# Patient Record
Sex: MALE | Race: OTHER
[De-identification: names, ages, dates, MRNs, and addresses within clinical notes are randomized per-mention and may not be internally consistent; named-entity substitution may affect disease eponyms.]

---

## 2019-03-17 ENCOUNTER — TRANSCRIPTION ENCOUNTER (OUTPATIENT)
Age: 24
End: 2019-03-17

## 2019-03-21 ENCOUNTER — TRANSCRIPTION ENCOUNTER (OUTPATIENT)
Age: 24
End: 2019-03-21

## 2019-09-13 ENCOUNTER — APPOINTMENT (OUTPATIENT)
Dept: INTERNAL MEDICINE | Facility: CLINIC | Age: 24
End: 2019-09-13

## 2020-08-26 ENCOUNTER — APPOINTMENT (OUTPATIENT)
Dept: UROLOGY | Facility: CLINIC | Age: 25
End: 2020-08-26
Payer: COMMERCIAL

## 2020-08-26 VITALS — WEIGHT: 141 LBS | BODY MASS INDEX: 17.53 KG/M2 | HEIGHT: 75 IN

## 2020-08-26 VITALS — TEMPERATURE: 97.6 F

## 2020-08-26 DIAGNOSIS — N41.1 CHRONIC PROSTATITIS: ICD-10-CM

## 2020-08-26 DIAGNOSIS — R10.2 PELVIC AND PERINEAL PAIN: ICD-10-CM

## 2020-08-26 PROCEDURE — 99205 OFFICE O/P NEW HI 60 MIN: CPT

## 2020-08-27 LAB
APPEARANCE: CLEAR
BACTERIA: NEGATIVE
BILIRUBIN URINE: NEGATIVE
BLOOD URINE: NEGATIVE
COLOR: YELLOW
GLUCOSE QUALITATIVE U: NEGATIVE
HYALINE CASTS: 1 /LPF
KETONES URINE: NEGATIVE
LEUKOCYTE ESTERASE URINE: NEGATIVE
MICROSCOPIC-UA: NORMAL
NITRITE URINE: NEGATIVE
PH URINE: 6
PROTEIN URINE: NORMAL
RED BLOOD CELLS URINE: 4 /HPF
SPECIFIC GRAVITY URINE: 1.02
SQUAMOUS EPITHELIAL CELLS: 0 /HPF
UROBILINOGEN URINE: NORMAL
WHITE BLOOD CELLS URINE: 1 /HPF

## 2020-10-29 ENCOUNTER — APPOINTMENT (OUTPATIENT)
Dept: UROLOGY | Facility: CLINIC | Age: 25
End: 2020-10-29
Payer: COMMERCIAL

## 2020-10-29 VITALS — TEMPERATURE: 97.9 F

## 2020-10-29 DIAGNOSIS — M79.18 MYALGIA, OTHER SITE: ICD-10-CM

## 2020-10-29 PROCEDURE — 99072 ADDL SUPL MATRL&STAF TM PHE: CPT

## 2020-10-29 PROCEDURE — 99215 OFFICE O/P EST HI 40 MIN: CPT

## 2020-10-29 RX ORDER — DIAZEPAM 2 MG/1
2 TABLET ORAL 3 TIMES DAILY
Qty: 9 | Refills: 0 | Status: DISCONTINUED | COMMUNITY
Start: 2020-08-26 | End: 2020-10-29

## 2020-11-05 ENCOUNTER — APPOINTMENT (OUTPATIENT)
Dept: UROLOGY | Facility: CLINIC | Age: 25
End: 2020-11-05

## 2020-11-17 ENCOUNTER — OUTPATIENT (OUTPATIENT)
Dept: OUTPATIENT SERVICES | Facility: HOSPITAL | Age: 25
LOS: 1 days | End: 2020-11-17
Payer: COMMERCIAL

## 2020-11-17 ENCOUNTER — APPOINTMENT (OUTPATIENT)
Dept: UROLOGY | Facility: CLINIC | Age: 25
End: 2020-11-17
Payer: COMMERCIAL

## 2020-11-17 VITALS
BODY MASS INDEX: 17.05 KG/M2 | DIASTOLIC BLOOD PRESSURE: 80 MMHG | HEIGHT: 76 IN | RESPIRATION RATE: 16 BRPM | WEIGHT: 140 LBS | TEMPERATURE: 97.9 F | SYSTOLIC BLOOD PRESSURE: 132 MMHG | HEART RATE: 73 BPM

## 2020-11-17 DIAGNOSIS — R35.0 FREQUENCY OF MICTURITION: ICD-10-CM

## 2020-11-17 PROCEDURE — 93975 VASCULAR STUDY: CPT | Mod: 26

## 2020-11-17 PROCEDURE — 76870 US EXAM SCROTUM: CPT

## 2020-11-17 PROCEDURE — 99215 OFFICE O/P EST HI 40 MIN: CPT | Mod: 25

## 2020-11-17 PROCEDURE — 93975 VASCULAR STUDY: CPT

## 2020-11-17 PROCEDURE — 76870 US EXAM SCROTUM: CPT | Mod: 26

## 2020-11-17 NOTE — HISTORY OF PRESENT ILLNESS
[FreeTextEntry1] : Patient is a 25-year-old gentleman who presents with the chief complaint of left testicular pain for the past 6 years for evaluation. I reviewed the questionnaire he had completed with him in detail.  Patient stated the pain was on the right and now has been to the left.  He has been evaluated for swollen right testis 2 to 3 years prior.  All evaluation has been normal he states his pain on the left is 4 out of 10 in intensity and it is dull and occurs every day.  He has tried several medications such as amitriptyline which has not improved his pain.  He also states he has back pain.  He has not had a spermatic cord block.  The patient denies fevers, chills, nausea and/or vomiting and no unexplained weight loss. He has no known drug allergies.  His past medical history demonstrates no significant urologic issues.  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  He does smoke and drinks only socially.  He has no known drug allergies.  His review of systems is non-contributory. His family history is not significant.

## 2020-11-17 NOTE — ASSESSMENT
[FreeTextEntry1] : This pleasant gentleman presents with concerns regarding scrotal pain which had been on the right then bilateral and now is on the left.  This has been present for the past 6 years and he states he also has back pain.  He had no pain in the left hemiscrotum with examination. I have requested several baseline blood studies and additional imaging.  I will discuss the results of the blood test with him in 2 weeks.  I will also make more specific recommendations after the results of the requested tests return.  I am not sure at this time whether the spermatic cord block is indicated since he has no pain on exam on the left, the pain is dull and is not consistent although it occurs during the day and at the time of examination now he has no discomfort.  Scrotal ultrasound demonstrated large varicoceles particularly on the left.  I will discuss this further with him when we review the blood test in 2 weeks.\par \par Consultation: 40 minutes:  20 minutes reviewing his history and performing a physical examination.  20 minutes reviewing the ultrasound, writing for blood studies ,discussing treatment options and writing his note. There was also additional time in preparation for today's visit.\par

## 2020-11-17 NOTE — PHYSICAL EXAM
[General Appearance - Well Developed] : well developed [General Appearance - Well Nourished] : well nourished [Normal Appearance] : normal appearance [Well Groomed] : well groomed [General Appearance - In No Acute Distress] : no acute distress [Heart Rate And Rhythm] : Heart rate and rhythm were normal [Arterial Pulses Normal] : the pedal pulses were normal [Edema] : no peripheral edema [Respiration, Rhythm And Depth] : normal respiratory rhythm and effort [Exaggerated Use Of Accessory Muscles For Inspiration] : no accessory muscle use [Auscultation Breath Sounds / Voice Sounds] : lungs were clear to auscultation bilaterally [Chest Palpation] : palpation of the chest revealed no abnormalities [Lungs Percussion] : the lungs were normal to percussion [Bowel Sounds] : normal bowel sounds [Abdomen Soft] : soft [Abdomen Tenderness] : non-tender [Abdomen Mass (___ Cm)] : no abdominal mass palpated [Abdomen Hernia] : no hernia was discovered [Costovertebral Angle Tenderness] : no ~M costovertebral angle tenderness [Urethral Meatus] : meatus normal [Penis Abnormality] : normal circumcised penis [Urinary Bladder Findings] : the bladder was normal on palpation [Scrotum] : the scrotum was normal [Epididymis] : the epididymides were normal [Testes Tenderness] : no tenderness of the testes [Testes Mass (___cm)] : there were no testicular masses [Anus Abnormality] : the anus and perineum were normal [No Prostate Nodules] : no prostate nodules [Normal Station and Gait] : the gait and station were normal for the patient's age [Skin Color & Pigmentation] : normal skin color and pigmentation [Skin Turgor] : supple [] : no rash [Skin Lesions] : no skin lesions [No Focal Deficits] : no focal deficits [Sensation] : the sensory exam was normal to light touch and pinprick [Motor Exam] : the motor exam was normal [Oriented To Time, Place, And Person] : oriented to person, place, and time [Affect] : the affect was normal [Mood] : the mood was normal [Not Anxious] : not anxious [No Palpable Adenopathy] : no palpable adenopathy [Cervical Lymph Nodes Enlarged Posterior Bilaterally] : posterior cervical [Cervical Lymph Nodes Enlarged Anterior Bilaterally] : anterior cervical [Supraclavicular Lymph Nodes Enlarged Bilaterally] : supraclavicular [Axillary Lymph Nodes Enlarged Bilaterally] : axillary [Femoral Lymph Nodes Enlarged Bilaterally] : femoral [Inguinal Lymph Nodes Enlarged Bilaterally] : inguinal [FreeTextEntry1] : Thin male

## 2020-11-18 LAB
BASOPHILS # BLD AUTO: 0.04 K/UL
BASOPHILS NFR BLD AUTO: 0.8 %
EOSINOPHIL # BLD AUTO: 0.13 K/UL
EOSINOPHIL NFR BLD AUTO: 2.6 %
ESTRADIOL SERPL-MCNC: 37 PG/ML
FSH SERPL-MCNC: 2.5 IU/L
HCT VFR BLD CALC: 49 %
HGB BLD-MCNC: 15.9 G/DL
IMM GRANULOCYTES NFR BLD AUTO: 0.2 %
LH SERPL-ACNC: 5.6 IU/L
LYMPHOCYTES # BLD AUTO: 1.74 K/UL
LYMPHOCYTES NFR BLD AUTO: 34.7 %
MAN DIFF?: NORMAL
MCHC RBC-ENTMCNC: 28.8 PG
MCHC RBC-ENTMCNC: 32.4 GM/DL
MCV RBC AUTO: 88.6 FL
MONOCYTES # BLD AUTO: 0.35 K/UL
MONOCYTES NFR BLD AUTO: 7 %
NEUTROPHILS # BLD AUTO: 2.74 K/UL
NEUTROPHILS NFR BLD AUTO: 54.7 %
PLATELET # BLD AUTO: 236 K/UL
RBC # BLD: 5.53 M/UL
RBC # FLD: 12 %
TSH SERPL-ACNC: 1.81 UIU/ML
WBC # FLD AUTO: 5.01 K/UL

## 2020-11-24 LAB
TESTOST BND SERPL-MCNC: 15.5 PG/ML
TESTOST SERPL-MCNC: 1065.7 NG/DL

## 2020-11-27 DIAGNOSIS — N50.812 LEFT TESTICULAR PAIN: ICD-10-CM

## 2020-11-27 DIAGNOSIS — I86.1 SCROTAL VARICES: ICD-10-CM

## 2020-12-02 ENCOUNTER — APPOINTMENT (OUTPATIENT)
Dept: UROLOGY | Facility: CLINIC | Age: 25
End: 2020-12-02
Payer: COMMERCIAL

## 2020-12-02 DIAGNOSIS — I86.1 SCROTAL VARICES: ICD-10-CM

## 2020-12-02 DIAGNOSIS — N50.812 LEFT TESTICULAR PAIN: ICD-10-CM

## 2020-12-02 DIAGNOSIS — N45.1 EPIDIDYMITIS: ICD-10-CM

## 2020-12-02 PROCEDURE — 99214 OFFICE O/P EST MOD 30 MIN: CPT | Mod: 95

## 2020-12-02 NOTE — PHYSICAL EXAM
[General Appearance - Well Developed] : well developed [General Appearance - Well Nourished] : well nourished [Normal Appearance] : normal appearance [Well Groomed] : well groomed [General Appearance - In No Acute Distress] : no acute distress [Oriented To Time, Place, And Person] : oriented to person, place, and time [Affect] : the affect was normal [Mood] : the mood was normal [Not Anxious] : not anxious [FreeTextEntry1] : Thin male

## 2020-12-02 NOTE — HISTORY OF PRESENT ILLNESS
[Home] : at home, [unfilled] , at the time of the visit. [Medical Office: ([facility])___] : at the medical office located in  [Verbal consent obtained from patient] : the patient, [unfilled] [FreeTextEntry1] : The patient-doctor. relationship has been established in a face-to-face fashion on real-time video audio HIPAA compliant communication using telemedicine software.  The patient's identity has been confirmed.  The patient was previously emailed a copy of the telemedicine consent.  The patient has had a chance to review and has now given verbal consent and has requested care to be assessed and treated through telemedicine. The patient understands there may be limitations in this process and that they need not need further follow-up care in the office and/or hospital settings. We were unable to use the [employer] platform and an alternative platform was utilized.\par \par Verbal consent was given on Wednesday, December 2, 2020 at 12:50 PM by the patient.  It was requested by the physician.  A written consent was previously sent for the patient to sign and return.\par \par The patient returns for follow-up to review his recent blood studies and to discuss options for therapy.  Of note he recently had pulled out his back again and noticed an increase in his scrotal discomfort and he himself feels it might be referred to this area.\par \par PMH: Patient initially presented with with the chief complaint of left testicular pain for the past 6 years for evaluation.  Patient stated the pain was on the right and now has been to the left.  He has been evaluated for swollen right testis 2 to 3 years prior.  All evaluation has been normal he states his pain on the left is 4 out of 10 in intensity and it is dull and occurs every day.  He has tried several medications such as amitriptyline which has not improved his pain.  He also states he has back pain.  He has not had a spermatic cord block.  The patient denies fevers, chills, nausea and/or vomiting and no unexplained weight loss. He has no known drug allergies.  His past medical history demonstrates no significant urologic issues.  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  He does smoke and drinks only socially.  He has no known drug allergies.  His review of systems is non-contributory. His family history is not significant.

## 2020-12-02 NOTE — ASSESSMENT
[FreeTextEntry1] : The patient returns for follow-up to review his recent blood studies and to discuss options for therapy.  Of note he recently had pulled out his back again and noticed an increase in his scrotal discomfort and he himself feels it might be referred to this area.\par \par I reviewed his blood tests with him which were all normal including of a high testosterone and he is not on any supplementation.  His semen analysis is also excellent.  I have to agree with him that it might be referred from his back.  I have given him a series of stretching exercises and have advised that he might want to do something like yoga to to help him self.  I have also suggested on particularly painful days to try a nonsteroid anti-inflammatory drug in addition to the exercises.  He will be trying them out for the next month and I will speak with him by telehealth in 4 to 6 weeks.\par \par I I will hold off on the spermatic cord block at this time.  Scrotal ultrasound demonstrated large varicoceles particularly on the left.  I will discuss this further with him when we review the blood test in 2 weeks.\par \par Telehealth Consultation: 30 minutes  10 minutes reviewing his history and discussing prior results.  20 minutes discussing various treatment options including stretching exercises and other programs that might be helpful, and writing his note. There was also additional time in preparing for the visit and assisting the patient with technology issues he was having with the telehealth platform.

## 2021-01-14 ENCOUNTER — APPOINTMENT (OUTPATIENT)
Dept: UROLOGY | Facility: CLINIC | Age: 26
End: 2021-01-14

## 2021-05-28 ENCOUNTER — APPOINTMENT (OUTPATIENT)
Dept: FAMILY MEDICINE | Facility: CLINIC | Age: 26
End: 2021-05-28
Payer: COMMERCIAL

## 2021-05-28 VITALS
SYSTOLIC BLOOD PRESSURE: 120 MMHG | TEMPERATURE: 98.2 F | DIASTOLIC BLOOD PRESSURE: 70 MMHG | BODY MASS INDEX: 16.2 KG/M2 | OXYGEN SATURATION: 98 % | WEIGHT: 133 LBS | HEIGHT: 76 IN | HEART RATE: 88 BPM

## 2021-05-28 DIAGNOSIS — Z80.8 FAMILY HISTORY OF MALIGNANT NEOPLASM OF OTHER ORGANS OR SYSTEMS: ICD-10-CM

## 2021-05-28 DIAGNOSIS — R10.9 UNSPECIFIED ABDOMINAL PAIN: ICD-10-CM

## 2021-05-28 DIAGNOSIS — Z86.16 PERSONAL HISTORY OF COVID-19: ICD-10-CM

## 2021-05-28 DIAGNOSIS — K59.00 CONSTIPATION, UNSPECIFIED: ICD-10-CM

## 2021-05-28 DIAGNOSIS — R63.4 ABNORMAL WEIGHT LOSS: ICD-10-CM

## 2021-05-28 PROCEDURE — 99072 ADDL SUPL MATRL&STAF TM PHE: CPT

## 2021-05-28 PROCEDURE — 99385 PREV VISIT NEW AGE 18-39: CPT | Mod: 25

## 2021-05-28 PROCEDURE — 36415 COLL VENOUS BLD VENIPUNCTURE: CPT

## 2021-05-28 PROCEDURE — 99213 OFFICE O/P EST LOW 20 MIN: CPT | Mod: 25

## 2021-05-28 RX ORDER — AMITRIPTYLINE HYDROCHLORIDE 10 MG/1
10 TABLET, FILM COATED ORAL
Qty: 90 | Refills: 4 | Status: DISCONTINUED | COMMUNITY
Start: 2020-10-29 | End: 2021-05-28

## 2021-05-28 NOTE — PHYSICAL EXAM
[Normal Oropharynx] : the oropharynx was normal [Normal TMs] : both tympanic membranes were normal [No Lymphadenopathy] : no lymphadenopathy [Supple] : supple [No Edema] : there was no peripheral edema [No Extremity Clubbing/Cyanosis] : no extremity clubbing/cyanosis [Normal] : affect was normal and insight and judgment were intact [de-identified] : thin [de-identified] : blake kelley/l [de-identified] : upper thoracic and cervical paraspinal tension [de-identified] : CN II-XII intact

## 2021-05-28 NOTE — HISTORY OF PRESENT ILLNESS
[FreeTextEntry1] : Mr. ARAUJO presents for annual physical and few other issues. \par  [de-identified] : Mr. ARAUJO presents for annual physical and few other issues. \par Following with colorectal-on Steroid cream-fissure. \par Has gastro appointment June 15th. Immediately bloated after eating, gets nausea.\par Constipated a lot. Eats a lot of sushi. \par Has been suffering from some headaches. \par \par Dr. Shaikh-Colorectal Surgeon in [place]\par Fissures and Hemorrhoids\par \par Had COVID January 2021.  Still no taste no smell.  Has not been eating well.  Lost weight.\par Long history of GERD- had Endoscopy when younger.\par  \par Family Hx-colon cancer-Dad passed at age 42. \par Skin cancer-paternal gma, maternal grandpa \par Siblings healthy \par \par Seasonal allerges; self diagnosed lactose intolerance\par Also will take colace as needed. \par

## 2021-05-28 NOTE — REVIEW OF SYSTEMS
[Nausea] : nausea [Constipation] : constipation [Heartburn] : heartburn [Negative] : Genitourinary [Earache] : no earache [Sore Throat] : no sore throat [Diarrhea] : no diarrhea [Headache] : no headache [Dizziness] : no dizziness [FreeTextEntry4] : can't smell or taste [FreeTextEntry7] : bloating, constipation [de-identified] : headaches occassionally [de-identified] : 6-7 hours of sleep per night

## 2021-05-28 NOTE — HEALTH RISK ASSESSMENT
[No] : No [HIV test declined] : HIV test declined [# of Members in Household ___] :  household currently consist of [unfilled] member(s) [Employed] : employed [Sexually Active] : sexually active [Fully functional (bathing, dressing, toileting, transferring, walking, feeding)] : Fully functional (bathing, dressing, toileting, transferring, walking, feeding) [Fully functional (using the telephone, shopping, preparing meals, housekeeping, doing laundry, using] : Fully functional and needs no help or supervision to perform IADLs (using the telephone, shopping, preparing meals, housekeeping, doing laundry, using transportation, managing medications and managing finances) [] : No [de-identified] : Varied diet  [de-identified] : mom, grandmother [FreeTextEntry2] : [employer]; [employer]  [de-identified] : Declines std testing, female partners [de-identified] : eyeglasses-eye exam 3 months ago [de-identified] : Dentist 4-5 months

## 2021-05-28 NOTE — PLAN
[FreeTextEntry1] : Will follow up labwork drawn in office today.\par \par Abdominal Discomfort-keep food journal, identify trigger foods.  Check stool studies since it has been ongoing. \par Has upcoming GI appointment and is following with colorectal.\par \par Will adjust meds based on labs. \par Patient expressed understanding of plan.\par \par

## 2021-06-01 LAB
25(OH)D3 SERPL-MCNC: 40.6 NG/ML
ALBUMIN SERPL ELPH-MCNC: 5 G/DL
ALP BLD-CCNC: 52 U/L
ALT SERPL-CCNC: 13 U/L
ANION GAP SERPL CALC-SCNC: 11 MMOL/L
APPEARANCE: CLEAR
AST SERPL-CCNC: 19 U/L
BASOPHILS # BLD AUTO: 0.04 K/UL
BASOPHILS NFR BLD AUTO: 0.6 %
BILIRUB SERPL-MCNC: 0.5 MG/DL
BILIRUBIN URINE: NEGATIVE
BLOOD URINE: NEGATIVE
BUN SERPL-MCNC: 11 MG/DL
CALCIUM SERPL-MCNC: 9.7 MG/DL
CHLORIDE SERPL-SCNC: 103 MMOL/L
CHOLEST SERPL-MCNC: 217 MG/DL
CO2 SERPL-SCNC: 26 MMOL/L
COLOR: COLORLESS
CREAT SERPL-MCNC: 1.15 MG/DL
EOSINOPHIL # BLD AUTO: 0.11 K/UL
EOSINOPHIL NFR BLD AUTO: 1.6 %
ESTIMATED AVERAGE GLUCOSE: 103 MG/DL
FOLATE SERPL-MCNC: 15 NG/ML
GLUCOSE QUALITATIVE U: NEGATIVE
GLUCOSE SERPL-MCNC: 86 MG/DL
HBA1C MFR BLD HPLC: 5.2 %
HCT VFR BLD CALC: 45.8 %
HDLC SERPL-MCNC: 68 MG/DL
HGB BLD-MCNC: 15.4 G/DL
IMM GRANULOCYTES NFR BLD AUTO: 0.1 %
KETONES URINE: NEGATIVE
LDLC SERPL CALC-MCNC: 134 MG/DL
LEUKOCYTE ESTERASE URINE: NEGATIVE
LYMPHOCYTES # BLD AUTO: 2.04 K/UL
LYMPHOCYTES NFR BLD AUTO: 29.1 %
MAN DIFF?: NORMAL
MCHC RBC-ENTMCNC: 29.1 PG
MCHC RBC-ENTMCNC: 33.6 GM/DL
MCV RBC AUTO: 86.6 FL
MONOCYTES # BLD AUTO: 0.43 K/UL
MONOCYTES NFR BLD AUTO: 6.1 %
NEUTROPHILS # BLD AUTO: 4.39 K/UL
NEUTROPHILS NFR BLD AUTO: 62.5 %
NITRITE URINE: NEGATIVE
NONHDLC SERPL-MCNC: 150 MG/DL
PH URINE: 6.5
PLATELET # BLD AUTO: 220 K/UL
POTASSIUM SERPL-SCNC: 4.2 MMOL/L
PROT SERPL-MCNC: 7.3 G/DL
PROTEIN URINE: NEGATIVE
RBC # BLD: 5.29 M/UL
RBC # FLD: 12.1 %
SODIUM SERPL-SCNC: 139 MMOL/L
SPECIFIC GRAVITY URINE: 1.01
TRIGL SERPL-MCNC: 78 MG/DL
TSH SERPL-ACNC: 1.14 UIU/ML
UROBILINOGEN URINE: NORMAL
VIT B12 SERPL-MCNC: 582 PG/ML
WBC # FLD AUTO: 7.02 K/UL

## 2021-06-10 ENCOUNTER — APPOINTMENT (OUTPATIENT)
Dept: FAMILY MEDICINE | Facility: CLINIC | Age: 26
End: 2021-06-10

## 2021-09-02 ENCOUNTER — TRANSCRIPTION ENCOUNTER (OUTPATIENT)
Age: 26
End: 2021-09-02

## 2021-11-03 ENCOUNTER — TRANSCRIPTION ENCOUNTER (OUTPATIENT)
Age: 26
End: 2021-11-03

## 2022-02-18 ENCOUNTER — APPOINTMENT (OUTPATIENT)
Dept: FAMILY MEDICINE | Facility: CLINIC | Age: 27
End: 2022-02-18

## 2022-03-02 ENCOUNTER — NON-APPOINTMENT (OUTPATIENT)
Age: 27
End: 2022-03-02

## 2022-03-02 ENCOUNTER — APPOINTMENT (OUTPATIENT)
Dept: FAMILY MEDICINE | Facility: CLINIC | Age: 27
End: 2022-03-02
Payer: COMMERCIAL

## 2022-03-02 DIAGNOSIS — K52.9 NONINFECTIVE GASTROENTERITIS AND COLITIS, UNSPECIFIED: ICD-10-CM

## 2022-03-02 PROCEDURE — 99213 OFFICE O/P EST LOW 20 MIN: CPT | Mod: 95

## 2022-03-02 NOTE — ASSESSMENT
[FreeTextEntry1] : gastroenteritis\par Patient was advised to drink plenty of fluid, preferably ones with electrolytes such as vitamin water or Gatorade. Patient was told to stick to a simple diet of bread, rice, tea and to avoid dairy, fatty foods and friend foods. Progress diet slowly and as tolerated. No antibiotics are necessary at this time.  If there is any worsening of symptoms, or no improvement, come back to this office or go directly to the ER.\par \par vomiting and fever for one day, nausea, now has not thrown up since this morning,\par keeping down gatorade and saltines\par \par monitor fever\par if no improvement, office visit for bw or if any worsening, ER

## 2022-03-02 NOTE — HISTORY OF PRESENT ILLNESS
[Home] : at home, [unfilled] , at the time of the visit. [Medical Office: ([facility])___] : at the medical office located in  [Verbal consent obtained from patient] : the patient, [unfilled] [FreeTextEntry8] : 26 year old male here with complaints of vomiting and fever for one day, nausea \par Patients active medications, allergies and issues were all reviewed with the patient at time of visit.\par

## 2022-03-31 PROBLEM — Z00.00 ENCOUNTER FOR PREVENTIVE HEALTH EXAMINATION: Noted: 2022-03-31

## 2022-04-24 ENCOUNTER — APPOINTMENT (OUTPATIENT)
Dept: FAMILY MEDICINE | Facility: CLINIC | Age: 27
End: 2022-04-24
Payer: COMMERCIAL

## 2022-04-24 VITALS
HEART RATE: 91 BPM | HEIGHT: 76 IN | OXYGEN SATURATION: 98 % | BODY MASS INDEX: 15.95 KG/M2 | DIASTOLIC BLOOD PRESSURE: 76 MMHG | TEMPERATURE: 98.6 F | WEIGHT: 131 LBS | SYSTOLIC BLOOD PRESSURE: 124 MMHG

## 2022-04-24 DIAGNOSIS — M54.81 OCCIPITAL NEURALGIA: ICD-10-CM

## 2022-04-24 PROCEDURE — 99214 OFFICE O/P EST MOD 30 MIN: CPT

## 2022-04-24 NOTE — ASSESSMENT
[FreeTextEntry1] : discussed R/B/A to MM\par pt will f/u with multiple providers\par certified for one year\par \par discussed L-P, ID consult

## 2022-04-24 NOTE — HISTORY OF PRESENT ILLNESS
[FreeTextEntry8] : 27 yr old male- reports was going to chiropractor- y roverto- 2/17/2022- S&S 8 hrs later fever 102.5, nausea and burning sensation at back of head- then next day was asymptomatic- got x rays from chiropractor- and then went to ortho Dr coleman at [facility] and chris - went for MRI-which reports showed just a neck spasm- recommended PT - \par reports on first day of PT- went to ED for fever, nausea had a TEB with Dr Alvarado- ddnt resolve- and went to ED [facility]- CTA - neg - recom for neuro follow up\par then followed up with Neuro - was given lidocaine injections  and muscle relaxer/cyclobenzaprine. and now on  baclofen which reports is working well-  and continued with PT - \par \par last wed- fever 101, burning sensation- back of head, nausea  lasts 24 hrs.

## 2022-04-26 DIAGNOSIS — R50.9 FEVER, UNSPECIFIED: ICD-10-CM

## 2022-05-09 ENCOUNTER — APPOINTMENT (OUTPATIENT)
Dept: INFECTIOUS DISEASE | Facility: CLINIC | Age: 27
End: 2022-05-09

## 2022-06-24 ENCOUNTER — APPOINTMENT (OUTPATIENT)
Dept: PAIN MANAGEMENT | Facility: CLINIC | Age: 27
End: 2022-06-24

## 2022-06-24 VITALS — HEIGHT: 76 IN | BODY MASS INDEX: 15.83 KG/M2 | WEIGHT: 130 LBS

## 2022-06-24 PROCEDURE — 99204 OFFICE O/P NEW MOD 45 MIN: CPT

## 2022-06-24 NOTE — HISTORY OF PRESENT ILLNESS
[Neck] : neck [Burning] : burning [Radiating] : radiating [Squeezing] : squeezing [Meds] : meds [Physical therapy] : physical therapy [FreeTextEntry1] : Initial HPI 06/24/2022: \par Pain started 4 months ago after chiropractic adjustment and is on the center neck and radiates up the neck to the head described as a burning spasm pain. Has been doing PT. Also says he has TMJ.  Gets random episodes of pain which includes burning spasm pain with nausea, fever and blurry vision and body weakness. Had a cervical MRI in the hospital and MRI of the pain including CTAs which was all normal. Has been seeing a neurologist and was getting botox. Neurologist recommended facet blocks. Also tried acupuncture which caused more burning. \par \par Physical Therapy: Yes\par Pain Medications: baclofen, gabapentin 100mg QHS \par Past Injections: none\par Spine surgery: none \par Blood thinners: none\par \par \par  [] : no [FreeTextEntry6] : swelling  [FreeTextEntry7] : back of the head [de-identified] : lifting, moving jaw the wrong way  [de-identified] : C MRI

## 2022-06-24 NOTE — PHYSICAL EXAM
[de-identified] : Constitutional; Appears well, no apparent distress\par Ability to communicate: Normal \par Respiratory: non-labored breathing\par Skin: No rash noted\par Head: Normocephalic, atraumatic\par Neck: no visible thyroid enlargement\par Eyes: Extraocular movements intact\par Neurologic: Alert and oriented x3\par Psychiatric: normal mood, affect and behavior \par \par  [] : paracervical tenderness

## 2022-06-24 NOTE — DISCUSSION/SUMMARY
[de-identified] : After discussing various treatment options with the patient including but not limited to oral medications, physical therapy, exercise modalities as well as interventional spinal injections, we have decided with the following plan:\par \par - Continue Home exercises, stretching, activity modification, physical therapy, and conservative care.\par - MRI report and/or images was reviewed and discussed with the patient.\par - Recommend First Diagnostic LEFT C3,4,5,6 Medial Branch Blocks under fluoroscopic guidance with image. \par - Recommend First Diagnostic RIGHT C3,4,5,6 Medial Branch Blocks under fluoroscopic guidance with image. \par - Patient presents with axial lumbar pain that has not responded to 3 months of conservative therapy including physical therapy or NSAID therapy.  The pain is interfering with activities of daily living and functionality. There is no radicular pain. The pain is exacerbated by facet loading. The patient has not had a vertebral fusion at the levels of the proposed treatment.  There is no unexplained neurologic deficit.  There is no history of systemic infection, unstable medical condition, bleeding tendency, or local infection.  The injection is being performed to diagnose the facet joint as the source of the individual's pain, in preparation for a radiofrequency ablation. \par - The risks, benefits, contents and alternatives to injection were explained in full to the patient.  Risks outlined include but are not limited to infection, sepsis, bleeding, post-dural puncture headache, nerve damage, temporary increase in pain, syncopal episode, failure to resolve symptoms, allergic reaction, symptom recurrence, and elevation of blood sugar in diabetics. Cortisone may cause immunosuppression.  Patient understands the risks.  All questions were answered.  After discussion of options, patient requested an injection.  Information regarding the injection was given to the patient.  Which medications to stop prior to the injection was explained to the patient as well.\par - Follow up in 1-2 weeks post injection for re-evaluation.\par - Will call to schedule.\par

## 2022-08-08 ENCOUNTER — FORM ENCOUNTER (OUTPATIENT)
Age: 27
End: 2022-08-08

## 2022-08-10 ENCOUNTER — FORM ENCOUNTER (OUTPATIENT)
Age: 27
End: 2022-08-10

## 2022-10-05 ENCOUNTER — APPOINTMENT (OUTPATIENT)
Dept: PAIN MANAGEMENT | Facility: CLINIC | Age: 27
End: 2022-10-05

## 2022-10-05 VITALS — HEIGHT: 76 IN | WEIGHT: 130 LBS | BODY MASS INDEX: 15.83 KG/M2

## 2022-10-05 PROCEDURE — 99214 OFFICE O/P EST MOD 30 MIN: CPT

## 2022-10-05 NOTE — PHYSICAL EXAM
[Normal Coordination] : normal coordination [Normal DTR UE/LE] : normal DTR UE/LE  [Normal Sensation] : normal sensation [Normal Mood and Affect] : normal mood and affect [Able to Communicate] : able to communicate [Normal Skin] : normal skin [No Rash] : no rash [No Ulcers] : no ulcers [No Lesions] : no lesions [No obvious lymphadenopathy in areas examined] : no obvious lymphadenopathy in areas examined [Well Developed] : well developed [Well Nourished] : well nourished [No Respiratory Distress] : no respiratory distress

## 2022-10-05 NOTE — DATA REVIEWED
[MRI] : MRI [Cervical Spine] : cervical spine [Report was reviewed and noted in the chart] : The report was reviewed and noted in the chart [FreeTextEntry1] : 3/2022 ANGELINA

## 2022-10-05 NOTE — HISTORY OF PRESENT ILLNESS
[Neck] : neck [4] : 4 [Burning] : burning [Dull/Aching] : dull/aching [Shooting] : shooting [Intermittent] : intermittent [Work] : work [Sleep] : sleep [Meds] : meds [Full time] : Work status: full time [FreeTextEntry1] : Pt.states his having a lot of neck pain \par \par Pt reports a history of back and neck pain treated with ongoing chiro treatments that he states during a treatment when a Y band was used for traction he felt the acute onset which was in February. He felt the sudden onset of jaw pain, nausea and posterior neck burning pain. He is currently on medication and it manages his symptoms well. Normally he is able to lift even a 50 lb box but then something as benign as showering he can have an acute episode most of which last 20-30 minutes. He was admitted to the hospital stating that he had a fever following this episode within that week. He had a fever he states by temporal scanner but not febrile PO. He also then started PT, acupuncture and botox all of which has not significantly changed the course of his symptoms. MRI was done, report not available for review.  [] : no [de-identified] : random

## 2022-10-05 NOTE — DISCUSSION/SUMMARY
[de-identified] : Reviewed MRI of March 2022 and Sept 2022 which reveals no identifiable pathology, not even a facet effusion suggesting prior trauma.This appears most likely a stretch of a nerve bundle or discrete nerve or an aggravated already present instability that patient may have had that was asymptomatic.  I advised that this may be time and conservative treatment prior to resolution. Continue gabapentin. There may be a role for an EMG to help better focus on a potential etiology of the pain if for no other reason just to give patient a concrete etiology and what he can expect for a time course. \par \par Followup prn. Continue to followup with neuro.

## 2022-10-06 ENCOUNTER — APPOINTMENT (OUTPATIENT)
Dept: NEUROLOGY | Facility: CLINIC | Age: 27
End: 2022-10-06

## 2022-10-06 DIAGNOSIS — M79.2 NEURALGIA AND NEURITIS, UNSPECIFIED: ICD-10-CM

## 2022-10-06 PROCEDURE — 95911 NRV CNDJ TEST 9-10 STUDIES: CPT

## 2022-10-06 PROCEDURE — 95886 MUSC TEST DONE W/N TEST COMP: CPT

## 2022-10-07 ENCOUNTER — APPOINTMENT (OUTPATIENT)
Dept: PAIN MANAGEMENT | Facility: CLINIC | Age: 27
End: 2022-10-07

## 2022-10-09 ENCOUNTER — NON-APPOINTMENT (OUTPATIENT)
Age: 27
End: 2022-10-09

## 2022-10-12 ENCOUNTER — APPOINTMENT (OUTPATIENT)
Dept: PAIN MANAGEMENT | Facility: CLINIC | Age: 27
End: 2022-10-12

## 2022-10-20 ENCOUNTER — APPOINTMENT (OUTPATIENT)
Dept: PAIN MANAGEMENT | Facility: CLINIC | Age: 27
End: 2022-10-20

## 2022-10-20 VITALS — BODY MASS INDEX: 17.16 KG/M2 | HEIGHT: 75 IN | WEIGHT: 138 LBS

## 2022-10-20 DIAGNOSIS — M47.812 SPONDYLOSIS W/OUT MYELOPATHY OR RADICULOPATHY, CERVICAL REGION: ICD-10-CM

## 2022-10-20 DIAGNOSIS — M54.2 CERVICALGIA: ICD-10-CM

## 2022-10-20 DIAGNOSIS — M79.10 MYALGIA, UNSPECIFIED SITE: ICD-10-CM

## 2022-10-20 PROCEDURE — 99213 OFFICE O/P EST LOW 20 MIN: CPT

## 2022-10-20 NOTE — HISTORY OF PRESENT ILLNESS
[Neck] : neck [4] : 4 [Burning] : burning [Dull/Aching] : dull/aching [Shooting] : shooting [Intermittent] : intermittent [Work] : work [Sleep] : sleep [Meds] : meds [Full time] : Work status: full time [FreeTextEntry1] : Pt.states his having a lot of neck pain \par \par Pt reports a history of back and neck pain treated with ongoing chiro treatments that he states during a treatment when a Y band was used for traction he felt the acute onset which was in February. He felt the sudden onset of jaw pain, nausea and posterior neck burning pain. He is currently on medication and it manages his symptoms well. Normally he is able to lift even a 50 lb box but then something as benign as showering he can have an acute episode most of which last 20-30 minutes. He was admitted to the hospital stating that he had a fever following this episode within that week. He had a fever he states by temporal scanner but not febrile PO. He also then started PT, acupuncture and botox all of which has not significantly changed the course of his symptoms. MRI was done, report not available for review.  [] : no [de-identified] : random

## 2022-10-20 NOTE — DISCUSSION/SUMMARY
[de-identified] : Pt understands normal study of EMG and MRI and he understands the chronology of a nerve injury and or stretch injury that may have occurred by either the chiro or acupuncturist or possibly both as he reports the burning pain got worse following an acupunture treatment. He is also pursuing treatment for his known TMJ and other orthodontic issues that are present as well. He will continue PT as well.

## 2022-11-15 ENCOUNTER — NON-APPOINTMENT (OUTPATIENT)
Age: 27
End: 2022-11-15

## 2022-12-05 ENCOUNTER — APPOINTMENT (OUTPATIENT)
Dept: ORTHOPEDIC SURGERY | Facility: CLINIC | Age: 27
End: 2022-12-05

## 2022-12-05 ENCOUNTER — NON-APPOINTMENT (OUTPATIENT)
Age: 27
End: 2022-12-05

## 2022-12-05 VITALS — WEIGHT: 135 LBS | HEIGHT: 76 IN | BODY MASS INDEX: 16.44 KG/M2

## 2023-03-24 ENCOUNTER — APPOINTMENT (OUTPATIENT)
Dept: FAMILY MEDICINE | Facility: CLINIC | Age: 28
End: 2023-03-24
Payer: COMMERCIAL

## 2023-03-24 VITALS
OXYGEN SATURATION: 98 % | DIASTOLIC BLOOD PRESSURE: 74 MMHG | WEIGHT: 138 LBS | TEMPERATURE: 98.6 F | BODY MASS INDEX: 16.81 KG/M2 | SYSTOLIC BLOOD PRESSURE: 112 MMHG | HEIGHT: 76 IN | HEART RATE: 84 BPM

## 2023-03-24 DIAGNOSIS — M25.50 PAIN IN UNSPECIFIED JOINT: ICD-10-CM

## 2023-03-24 PROCEDURE — 36415 COLL VENOUS BLD VENIPUNCTURE: CPT

## 2023-03-24 PROCEDURE — 99214 OFFICE O/P EST MOD 30 MIN: CPT | Mod: 25

## 2023-03-24 NOTE — HISTORY OF PRESENT ILLNESS
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   \par Felt cool sensation in arm. \par Right index finger.  2-3 days was swollen-better than what it was.  \par Iced it.  Not painful but pressure.  Was unable to bend it.  Is better than it was a few days ago.  No Known injury.\par \par Ongoing neck pain-Neck injury-going with PT.  Saw HSS-3 months ago for neck.  History of knee pain.  Chrondomallecia patella.  \par Had MM card-did not help.  \par \par Sees Neurologist-Dr. Alamo-Baclofen tid a day, for neck.  \par \par Had peanut bar with honey-2 days ago.  Tongue tingled-took Benadryl which helped.  \par

## 2023-03-24 NOTE — REVIEW OF SYSTEMS
[Constipation] : constipation [Fever] : no fever [Chills] : no chills [Nasal Discharge] : no nasal discharge [Sore Throat] : no sore throat [Chest Pain] : no chest pain [Shortness Of Breath] : no shortness of breath [Wheezing] : no wheezing [Nausea] : no nausea [Diarrhea] : no diarrhea [Vomiting] : no vomiting [Dysuria] : no dysuria [Headache] : no headache [Dizziness] : no dizziness [FreeTextEntry7] : drinking plenty of fluids-on metamucil, last BM yesterday

## 2023-03-24 NOTE — PLAN
[FreeTextEntry1] : Will follow up labwork drawn in office today.\par Joint Pain/Swelling-check inflammatory markers. Rheum eval. \par Check Uric Acid level, CBC. \par \par Ortho Hand Referral placed for reduced ROM in index finger.\par Can continue to ice.  Ibuprofen or motrin as needed. \par \par Patient will be making follow-up appointment with his GI. \par

## 2023-03-24 NOTE — PHYSICAL EXAM
[Normal] : affect was normal and insight and judgment were intact [No Acute Distress] : no acute distress [Well Developed] : well developed [Normal Oropharynx] : the oropharynx was normal [No Lymphadenopathy] : no lymphadenopathy [No Extremity Clubbing/Cyanosis] : no extremity clubbing/cyanosis [Grossly Normal Strength/Tone] : grossly normal strength/tone [de-identified] :  strength intact b/l; mild edema right index finger, slight erythema, flexion intact  [de-identified] : negative tinel's sign b/l

## 2023-03-27 DIAGNOSIS — M79.644 PAIN IN RIGHT FINGER(S): ICD-10-CM

## 2023-03-27 LAB
ALBUMIN SERPL ELPH-MCNC: 5.1 G/DL
ALP BLD-CCNC: 60 U/L
ALT SERPL-CCNC: 26 U/L
ANION GAP SERPL CALC-SCNC: 12 MMOL/L
AST SERPL-CCNC: 30 U/L
BASOPHILS # BLD AUTO: 0.05 K/UL
BASOPHILS NFR BLD AUTO: 0.8 %
BILIRUB SERPL-MCNC: 0.4 MG/DL
BUN SERPL-MCNC: 10 MG/DL
CALCIUM SERPL-MCNC: 10.3 MG/DL
CHLORIDE SERPL-SCNC: 101 MMOL/L
CO2 SERPL-SCNC: 29 MMOL/L
CREAT SERPL-MCNC: 1.13 MG/DL
CRP SERPL-MCNC: <3 MG/L
EGFR: 92 ML/MIN/1.73M2
EOSINOPHIL # BLD AUTO: 0.15 K/UL
EOSINOPHIL NFR BLD AUTO: 2.3 %
ERYTHROCYTE [SEDIMENTATION RATE] IN BLOOD BY WESTERGREN METHOD: 2 MM/HR
GLUCOSE SERPL-MCNC: 51 MG/DL
HCT VFR BLD CALC: 48.2 %
HGB BLD-MCNC: 15.8 G/DL
IMM GRANULOCYTES NFR BLD AUTO: 0.3 %
LYMPHOCYTES # BLD AUTO: 1.9 K/UL
LYMPHOCYTES NFR BLD AUTO: 29.5 %
MAN DIFF?: NORMAL
MCHC RBC-ENTMCNC: 29.6 PG
MCHC RBC-ENTMCNC: 32.8 GM/DL
MCV RBC AUTO: 90.4 FL
MONOCYTES # BLD AUTO: 0.49 K/UL
MONOCYTES NFR BLD AUTO: 7.6 %
NEUTROPHILS # BLD AUTO: 3.84 K/UL
NEUTROPHILS NFR BLD AUTO: 59.5 %
PLATELET # BLD AUTO: 236 K/UL
POTASSIUM SERPL-SCNC: 4.7 MMOL/L
PROT SERPL-MCNC: 7.3 G/DL
RBC # BLD: 5.33 M/UL
RBC # FLD: 12.3 %
RHEUMATOID FACT SER QL: <10 IU/ML
SODIUM SERPL-SCNC: 142 MMOL/L
TSH SERPL-ACNC: 1.54 UIU/ML
URATE SERPL-MCNC: 4.2 MG/DL
WBC # FLD AUTO: 6.45 K/UL

## 2023-03-29 ENCOUNTER — APPOINTMENT (OUTPATIENT)
Dept: ORTHOPEDIC SURGERY | Facility: CLINIC | Age: 28
End: 2023-03-29

## 2023-07-14 ENCOUNTER — NON-APPOINTMENT (OUTPATIENT)
Age: 28
End: 2023-07-14

## 2023-11-28 ENCOUNTER — NON-APPOINTMENT (OUTPATIENT)
Age: 28
End: 2023-11-28

## 2024-02-19 ENCOUNTER — NON-APPOINTMENT (OUTPATIENT)
Age: 29
End: 2024-02-19

## 2024-03-22 ENCOUNTER — APPOINTMENT (OUTPATIENT)
Dept: FAMILY MEDICINE | Facility: CLINIC | Age: 29
End: 2024-03-22
Payer: MEDICAID

## 2024-03-22 VITALS
DIASTOLIC BLOOD PRESSURE: 77 MMHG | OXYGEN SATURATION: 98 % | WEIGHT: 141 LBS | BODY MASS INDEX: 17.17 KG/M2 | RESPIRATION RATE: 15 BRPM | TEMPERATURE: 97.6 F | HEIGHT: 76 IN | SYSTOLIC BLOOD PRESSURE: 112 MMHG | HEART RATE: 94 BPM

## 2024-03-22 DIAGNOSIS — G89.29 CERVICALGIA: ICD-10-CM

## 2024-03-22 DIAGNOSIS — Z00.00 ENCOUNTER FOR GENERAL ADULT MEDICAL EXAMINATION W/OUT ABNORMAL FINDINGS: ICD-10-CM

## 2024-03-22 DIAGNOSIS — Z13.1 ENCOUNTER FOR SCREENING FOR DIABETES MELLITUS: ICD-10-CM

## 2024-03-22 DIAGNOSIS — E55.9 VITAMIN D DEFICIENCY, UNSPECIFIED: ICD-10-CM

## 2024-03-22 DIAGNOSIS — M54.2 CERVICALGIA: ICD-10-CM

## 2024-03-22 PROCEDURE — 99395 PREV VISIT EST AGE 18-39: CPT

## 2024-03-22 RX ORDER — AMITRIPTYLINE HYDROCHLORIDE 25 MG/1
25 TABLET, FILM COATED ORAL
Refills: 0 | Status: DISCONTINUED | COMMUNITY
End: 2024-03-22

## 2024-03-22 RX ORDER — BACLOFEN 10 MG/1
10 TABLET ORAL
Refills: 0 | Status: DISCONTINUED | COMMUNITY
End: 2024-03-22

## 2024-03-22 NOTE — PHYSICAL EXAM
[No Acute Distress] : no acute distress [Well Nourished] : well nourished [Well Developed] : well developed [Well-Appearing] : well-appearing [Normal Sclera/Conjunctiva] : normal sclera/conjunctiva [PERRL] : pupils equal round and reactive to light [Normal Outer Ear/Nose] : the outer ears and nose were normal in appearance [Normal Oropharynx] : the oropharynx was normal [No JVD] : no jugular venous distention [No Lymphadenopathy] : no lymphadenopathy [Supple] : supple [Thyroid Normal, No Nodules] : the thyroid was normal and there were no nodules present [No Respiratory Distress] : no respiratory distress  [No Accessory Muscle Use] : no accessory muscle use [Normal Rate] : normal rate  [Clear to Auscultation] : lungs were clear to auscultation bilaterally [Regular Rhythm] : with a regular rhythm [Normal S1, S2] : normal S1 and S2 [No Murmur] : no murmur heard [No Carotid Bruits] : no carotid bruits [No Varicosities] : no varicosities [Pedal Pulses Present] : the pedal pulses are present [No Edema] : there was no peripheral edema [No Extremity Clubbing/Cyanosis] : no extremity clubbing/cyanosis [Soft] : abdomen soft [Non Tender] : non-tender [Non-distended] : non-distended [No Masses] : no abdominal mass palpated [Normal Bowel Sounds] : normal bowel sounds [No HSM] : no HSM [Normal Posterior Cervical Nodes] : no posterior cervical lymphadenopathy [Normal Anterior Cervical Nodes] : no anterior cervical lymphadenopathy [No CVA Tenderness] : no CVA  tenderness [No Spinal Tenderness] : no spinal tenderness [Grossly Normal Strength/Tone] : grossly normal strength/tone [No Rash] : no rash [No Joint Swelling] : no joint swelling [No Focal Deficits] : no focal deficits [Coordination Grossly Intact] : coordination grossly intact [Normal Gait] : normal gait [Normal Affect] : the affect was normal [Alert and Oriented x3] : oriented to person, place, and time [Normal Insight/Judgement] : insight and judgment were intact

## 2024-03-24 PROBLEM — M54.2 CHRONIC NECK PAIN: Status: ACTIVE | Noted: 2022-04-24

## 2024-03-24 NOTE — HEALTH RISK ASSESSMENT
[Good] : ~his/her~  mood as  good [No] : In the past 12 months have you used drugs other than those required for medical reasons? No [Little interest or pleasure doing things] : 1) Little interest or pleasure doing things [Feeling down, depressed, or hopeless] : 2) Feeling down, depressed, or hopeless [0] : 2) Feeling down, depressed, or hopeless: Not at all (0) [PHQ-2 Negative - No further assessment needed] : PHQ-2 Negative - No further assessment needed [HIV test declined] : HIV test declined [Hepatitis C test declined] : Hepatitis C test declined [With Family] : lives with family [Employed] : employed [College] : College [Significant Other] : lives with significant other [Feels Safe at Home] : Feels safe at home [Sexually Active] : sexually active [Fully functional (bathing, dressing, toileting, transferring, walking, feeding)] : Fully functional (bathing, dressing, toileting, transferring, walking, feeding) [Fully functional (using the telephone, shopping, preparing meals, housekeeping, doing laundry, using] : Fully functional and needs no help or supervision to perform IADLs (using the telephone, shopping, preparing meals, housekeeping, doing laundry, using transportation, managing medications and managing finances) [Never] : Never [de-identified] : goes to PT 2/week  [[id number]] : 0 [de-identified] : Regular  [Change in mental status noted] : No change in mental status noted [Reports changes in hearing] : Reports no changes in hearing [Reports changes in vision] : Reports no changes in vision [de-identified] : wears glasses [Travel to Developing Areas] : does not  travel to developing areas

## 2024-03-24 NOTE — HISTORY OF PRESENT ILLNESS
[FreeTextEntry1] : CPE [de-identified] : Mr. BRANDEN ARAUJO is a 28 year-old male presents today for CPE.  Has c/c neck pain - doing Pt/cup therapy. Planning to see massage therapist.

## 2024-03-28 LAB
25(OH)D3 SERPL-MCNC: 29.4 NG/ML
ALBUMIN SERPL ELPH-MCNC: 5.1 G/DL
ALP BLD-CCNC: 63 U/L
ALT SERPL-CCNC: 19 U/L
ANION GAP SERPL CALC-SCNC: 12 MMOL/L
APPEARANCE: CLEAR
AST SERPL-CCNC: 22 U/L
BASOPHILS # BLD AUTO: 0.03 K/UL
BASOPHILS NFR BLD AUTO: 0.6 %
BILIRUB SERPL-MCNC: 0.4 MG/DL
BILIRUBIN URINE: NEGATIVE
BLOOD URINE: NEGATIVE
BUN SERPL-MCNC: 10 MG/DL
CALCIUM SERPL-MCNC: 9.7 MG/DL
CHLORIDE SERPL-SCNC: 103 MMOL/L
CHOLEST SERPL-MCNC: 213 MG/DL
CO2 SERPL-SCNC: 27 MMOL/L
COLOR: YELLOW
CREAT SERPL-MCNC: 1.31 MG/DL
EGFR: 77 ML/MIN/1.73M2
EOSINOPHIL # BLD AUTO: 0.21 K/UL
EOSINOPHIL NFR BLD AUTO: 4.1 %
ESTIMATED AVERAGE GLUCOSE: 105 MG/DL
GLUCOSE QUALITATIVE U: NEGATIVE MG/DL
GLUCOSE SERPL-MCNC: 92 MG/DL
HBA1C MFR BLD HPLC: 5.3 %
HCT VFR BLD CALC: 48.8 %
HDLC SERPL-MCNC: 68 MG/DL
HGB BLD-MCNC: 16.3 G/DL
IMM GRANULOCYTES NFR BLD AUTO: 0.2 %
KETONES URINE: ABNORMAL MG/DL
LDLC SERPL CALC-MCNC: 132 MG/DL
LEUKOCYTE ESTERASE URINE: NEGATIVE
LYMPHOCYTES # BLD AUTO: 1.51 K/UL
LYMPHOCYTES NFR BLD AUTO: 29.3 %
MAN DIFF?: NORMAL
MCHC RBC-ENTMCNC: 29 PG
MCHC RBC-ENTMCNC: 33.4 GM/DL
MCV RBC AUTO: 86.8 FL
MONOCYTES # BLD AUTO: 0.28 K/UL
MONOCYTES NFR BLD AUTO: 5.4 %
NEUTROPHILS # BLD AUTO: 3.11 K/UL
NEUTROPHILS NFR BLD AUTO: 60.4 %
NITRITE URINE: NEGATIVE
NONHDLC SERPL-MCNC: 145 MG/DL
PH URINE: 5.5
PLATELET # BLD AUTO: 237 K/UL
POTASSIUM SERPL-SCNC: 5.4 MMOL/L
PROT SERPL-MCNC: 7.3 G/DL
PROTEIN URINE: NEGATIVE MG/DL
RBC # BLD: 5.62 M/UL
RBC # FLD: 12.5 %
SODIUM SERPL-SCNC: 142 MMOL/L
SPECIFIC GRAVITY URINE: 1.02
TRIGL SERPL-MCNC: 73 MG/DL
TSH SERPL-ACNC: 1.59 UIU/ML
UROBILINOGEN URINE: 0.2 MG/DL
WBC # FLD AUTO: 5.15 K/UL

## 2024-11-25 ENCOUNTER — NON-APPOINTMENT (OUTPATIENT)
Age: 29
End: 2024-11-25

## 2025-03-17 ENCOUNTER — APPOINTMENT (OUTPATIENT)
Dept: FAMILY MEDICINE | Facility: CLINIC | Age: 30
End: 2025-03-17
Payer: MEDICAID

## 2025-03-17 VITALS
DIASTOLIC BLOOD PRESSURE: 79 MMHG | HEIGHT: 76 IN | TEMPERATURE: 98.7 F | OXYGEN SATURATION: 97 % | RESPIRATION RATE: 15 BRPM | HEART RATE: 105 BPM | WEIGHT: 139 LBS | SYSTOLIC BLOOD PRESSURE: 120 MMHG | BODY MASS INDEX: 16.93 KG/M2

## 2025-03-17 DIAGNOSIS — M54.2 CERVICALGIA: ICD-10-CM

## 2025-03-17 DIAGNOSIS — M47.812 SPONDYLOSIS W/OUT MYELOPATHY OR RADICULOPATHY, CERVICAL REGION: ICD-10-CM

## 2025-03-17 DIAGNOSIS — M79.2 NEURALGIA AND NEURITIS, UNSPECIFIED: ICD-10-CM

## 2025-03-17 PROCEDURE — G2211 COMPLEX E/M VISIT ADD ON: CPT | Mod: NC

## 2025-03-17 PROCEDURE — 99213 OFFICE O/P EST LOW 20 MIN: CPT

## 2025-03-17 RX ORDER — BACLOFEN 10 MG/1
10 TABLET ORAL 3 TIMES DAILY
Qty: 30 | Refills: 0 | Status: ACTIVE | COMMUNITY
Start: 2025-03-17

## 2025-03-17 RX ORDER — DULOXETINE HYDROCHLORIDE 30 MG/1
30 CAPSULE, DELAYED RELEASE ORAL
Refills: 0 | Status: ACTIVE | COMMUNITY
Start: 2025-03-17

## 2025-04-21 ENCOUNTER — APPOINTMENT (OUTPATIENT)
Dept: FAMILY MEDICINE | Facility: CLINIC | Age: 30
End: 2025-04-21
Payer: MEDICAID

## 2025-04-21 VITALS
OXYGEN SATURATION: 99 % | WEIGHT: 141 LBS | RESPIRATION RATE: 15 BRPM | HEIGHT: 76 IN | HEART RATE: 95 BPM | SYSTOLIC BLOOD PRESSURE: 122 MMHG | DIASTOLIC BLOOD PRESSURE: 79 MMHG | TEMPERATURE: 97.9 F | BODY MASS INDEX: 17.17 KG/M2

## 2025-04-21 DIAGNOSIS — Z00.00 ENCOUNTER FOR GENERAL ADULT MEDICAL EXAMINATION W/OUT ABNORMAL FINDINGS: ICD-10-CM

## 2025-04-21 DIAGNOSIS — E55.9 VITAMIN D DEFICIENCY, UNSPECIFIED: ICD-10-CM

## 2025-04-21 DIAGNOSIS — Z13.1 ENCOUNTER FOR SCREENING FOR DIABETES MELLITUS: ICD-10-CM

## 2025-04-21 PROCEDURE — 99395 PREV VISIT EST AGE 18-39: CPT

## 2025-04-22 LAB
25(OH)D3 SERPL-MCNC: 23.4 NG/ML
ALBUMIN SERPL ELPH-MCNC: 4.8 G/DL
ALP BLD-CCNC: 58 U/L
ALT SERPL-CCNC: 17 U/L
ANION GAP SERPL CALC-SCNC: 12 MMOL/L
APPEARANCE: CLEAR
AST SERPL-CCNC: 26 U/L
BACTERIA: NEGATIVE /HPF
BASOPHILS # BLD AUTO: 0.04 K/UL
BASOPHILS NFR BLD AUTO: 0.5 %
BILIRUB SERPL-MCNC: 0.6 MG/DL
BILIRUBIN URINE: NEGATIVE
BLOOD URINE: NEGATIVE
BUN SERPL-MCNC: 12 MG/DL
CALCIUM SERPL-MCNC: 9.5 MG/DL
CAST: 0 /LPF
CHLORIDE SERPL-SCNC: 101 MMOL/L
CHOLEST SERPL-MCNC: 197 MG/DL
CO2 SERPL-SCNC: 27 MMOL/L
COLOR: YELLOW
CREAT SERPL-MCNC: 1.17 MG/DL
EGFRCR SERPLBLD CKD-EPI 2021: 87 ML/MIN/1.73M2
EOSINOPHIL # BLD AUTO: 0.15 K/UL
EOSINOPHIL NFR BLD AUTO: 2 %
EPITHELIAL CELLS: 0 /HPF
ESTIMATED AVERAGE GLUCOSE: 114 MG/DL
GLUCOSE QUALITATIVE U: NEGATIVE MG/DL
GLUCOSE SERPL-MCNC: 61 MG/DL
HBA1C MFR BLD HPLC: 5.6 %
HCT VFR BLD CALC: 47 %
HDLC SERPL-MCNC: 67 MG/DL
HGB BLD-MCNC: 15.2 G/DL
IMM GRANULOCYTES NFR BLD AUTO: 0.3 %
KETONES URINE: NEGATIVE MG/DL
LDLC SERPL-MCNC: 100 MG/DL
LEUKOCYTE ESTERASE URINE: NEGATIVE
LYMPHOCYTES # BLD AUTO: 1.75 K/UL
LYMPHOCYTES NFR BLD AUTO: 23.7 %
MAN DIFF?: NORMAL
MCHC RBC-ENTMCNC: 28.7 PG
MCHC RBC-ENTMCNC: 32.3 G/DL
MCV RBC AUTO: 88.7 FL
MICROSCOPIC-UA: NORMAL
MONOCYTES # BLD AUTO: 0.5 K/UL
MONOCYTES NFR BLD AUTO: 6.8 %
NEUTROPHILS # BLD AUTO: 4.91 K/UL
NEUTROPHILS NFR BLD AUTO: 66.7 %
NITRITE URINE: NEGATIVE
NONHDLC SERPL-MCNC: 130 MG/DL
PH URINE: 7
PLATELET # BLD AUTO: 222 K/UL
POTASSIUM SERPL-SCNC: 5.1 MMOL/L
PROT SERPL-MCNC: 7.1 G/DL
PROTEIN URINE: NEGATIVE MG/DL
RBC # BLD: 5.3 M/UL
RBC # FLD: 12.4 %
RED BLOOD CELLS URINE: 0 /HPF
SODIUM SERPL-SCNC: 139 MMOL/L
SPECIFIC GRAVITY URINE: 1.01
TRIGL SERPL-MCNC: 176 MG/DL
TSH SERPL-ACNC: 1.34 UIU/ML
UROBILINOGEN URINE: 0.2 MG/DL
WBC # FLD AUTO: 7.37 K/UL
WHITE BLOOD CELLS URINE: 0 /HPF

## 2025-04-25 ENCOUNTER — NON-APPOINTMENT (OUTPATIENT)
Age: 30
End: 2025-04-25

## 2025-06-15 ENCOUNTER — NON-APPOINTMENT (OUTPATIENT)
Age: 30
End: 2025-06-15

## 2025-07-10 ENCOUNTER — APPOINTMENT (OUTPATIENT)
Dept: OTOLARYNGOLOGY | Facility: CLINIC | Age: 30
End: 2025-07-10
Payer: MEDICAID

## 2025-07-10 PROCEDURE — 31231 NASAL ENDOSCOPY DX: CPT

## 2025-07-10 PROCEDURE — 99204 OFFICE O/P NEW MOD 45 MIN: CPT | Mod: 25

## 2025-07-10 RX ORDER — BUDESONIDE 0.5 MG/2ML
0.5 INHALANT ORAL
Qty: 120 | Refills: 2 | Status: ACTIVE | COMMUNITY
Start: 2025-07-10 | End: 1900-01-01